# Patient Record
Sex: MALE | Race: BLACK OR AFRICAN AMERICAN | NOT HISPANIC OR LATINO | Employment: FULL TIME | URBAN - METROPOLITAN AREA
[De-identification: names, ages, dates, MRNs, and addresses within clinical notes are randomized per-mention and may not be internally consistent; named-entity substitution may affect disease eponyms.]

---

## 2023-08-04 LAB
CHLAMYDIA TRACH., AMPLIFIED: NEGATIVE
N. GONORRHEA, AMPLIFIED: NEGATIVE

## 2025-06-29 ENCOUNTER — HOSPITAL ENCOUNTER (EMERGENCY)
Facility: HOSPITAL | Age: 25
Discharge: HOME | End: 2025-06-29
Payer: COMMERCIAL

## 2025-06-29 ENCOUNTER — APPOINTMENT (OUTPATIENT)
Dept: RADIOLOGY | Facility: HOSPITAL | Age: 25
End: 2025-06-29
Payer: COMMERCIAL

## 2025-06-29 VITALS
HEART RATE: 67 BPM | OXYGEN SATURATION: 100 % | WEIGHT: 155 LBS | TEMPERATURE: 98.1 F | DIASTOLIC BLOOD PRESSURE: 70 MMHG | SYSTOLIC BLOOD PRESSURE: 150 MMHG | HEIGHT: 72 IN | RESPIRATION RATE: 16 BRPM | BODY MASS INDEX: 20.99 KG/M2

## 2025-06-29 DIAGNOSIS — S62.326A DISPLACED FRACTURE OF SHAFT OF FIFTH METACARPAL BONE, RIGHT HAND, INITIAL ENCOUNTER FOR CLOSED FRACTURE: Primary | ICD-10-CM

## 2025-06-29 PROCEDURE — 73130 X-RAY EXAM OF HAND: CPT | Mod: RIGHT SIDE | Performed by: RADIOLOGY

## 2025-06-29 PROCEDURE — 2500000001 HC RX 250 WO HCPCS SELF ADMINISTERED DRUGS (ALT 637 FOR MEDICARE OP)

## 2025-06-29 PROCEDURE — 99283 EMERGENCY DEPT VISIT LOW MDM: CPT

## 2025-06-29 PROCEDURE — 73130 X-RAY EXAM OF HAND: CPT | Mod: RT

## 2025-06-29 PROCEDURE — 29125 APPL SHORT ARM SPLINT STATIC: CPT

## 2025-06-29 RX ORDER — ACETAMINOPHEN 325 MG/1
650 TABLET ORAL EVERY 6 HOURS PRN
Qty: 30 TABLET | Refills: 0 | Status: SHIPPED | OUTPATIENT
Start: 2025-06-29

## 2025-06-29 RX ORDER — IBUPROFEN 800 MG/1
800 TABLET, FILM COATED ORAL 3 TIMES DAILY
Qty: 21 TABLET | Refills: 0 | Status: SHIPPED | OUTPATIENT
Start: 2025-06-29 | End: 2025-07-06

## 2025-06-29 RX ORDER — ACETAMINOPHEN 325 MG/1
975 TABLET ORAL ONCE
Status: COMPLETED | OUTPATIENT
Start: 2025-06-29 | End: 2025-06-29

## 2025-06-29 RX ORDER — IBUPROFEN 400 MG/1
400 TABLET, FILM COATED ORAL ONCE
Status: COMPLETED | OUTPATIENT
Start: 2025-06-29 | End: 2025-06-29

## 2025-06-29 RX ADMIN — ACETAMINOPHEN 975 MG: 325 TABLET ORAL at 17:17

## 2025-06-29 RX ADMIN — IBUPROFEN 400 MG: 400 TABLET ORAL at 17:17

## 2025-06-29 ASSESSMENT — PAIN SCALES - GENERAL: PAINLEVEL_OUTOF10: 6

## 2025-06-29 ASSESSMENT — PAIN - FUNCTIONAL ASSESSMENT: PAIN_FUNCTIONAL_ASSESSMENT: 0-10

## 2025-06-29 NOTE — ED PROVIDER NOTES
HPI   Chief Complaint   Patient presents with    Hand Pain       Patient is a 25-year-old male presenting to the emergency department for evaluation of pain in the right hand after punching a basketball pole.  Patient states it happened approximately an hour prior to arrival.  He is not complaining of pain in the right pinky and ring finger specifically with movement.  He has noticed increased swelling in it as well.  States he has not take any medications for the pain.  Denies any numbness or tingling in the arm or hand.              Patient History   Medical History[1]  Surgical History[2]  Family History[3]  Social History[4]    Physical Exam   ED Triage Vitals [06/29/25 1658]   Temperature Heart Rate Respirations BP   36.7 °C (98.1 °F) 67 16 150/70      Pulse Ox Temp Source Heart Rate Source Patient Position   100 % Temporal -- --      BP Location FiO2 (%)     -- --       Physical Exam  Vitals and nursing note reviewed.     GENERAL APPEARANCE: This patient is in no acute respiratory distress. Awake and alert. Talking appropriately. Answering questions appropriately. No evidence of pressured speech.    VITAL SIGNS: As per the nurses' triage record.    HEENT: Normocephalic, atraumatic.    NECK:  full gross ROM during exam    MUSCULOSKELETAL: Limited range of motion of the right pinky and ring finger with overlying swelling and ecchymosis along the dorsal aspect of the knuckles.    NEUROLOGICAL: Awake, alert and oriented x 3.  Normal sensation along the right radial, ulnar, and median nerve distribution of the right hand.    IMMUNOLOGICAL: No palpable lymphadenopathy or lymphatic streaking noted on visible skin.    DERM: Swelling and ecchymosis noted to the dorsal aspect of the right hand    PSYCH: mood and affect appear normal.        ED Course & MDM   Diagnoses as of 06/29/25 1816   Displaced fracture of shaft of fifth metacarpal bone, right hand, initial encounter for closed fracture                 No data  recorded     [place] Coma Scale Score: 15 (06/29/25 1658 : Chandrika Champagne RN)                           Medical Decision Making  **Disclaimer parts of this chart have been completed using voice recognition software. Please excuse any errors of transcription.     Evaluated this patient independently and my supervising physician was available for consultation.    HPI: Detailed above.    Exam: A medically appropriate exam performed, outlined above, given the known history and presentation.    History obtained from: Patient    Labs/Diagnostics:  XR hand right 3+ views   Final Result   Acute mildly displaced fracture of the 5th metacarpal mid shaft with   associated soft tissue swelling.             MACRO:   None        Signed by: Dalton Welch 6/29/2025 6:04 PM   Dictation workstation:   [id number]        EMERGENCY DEPARTMENT COURSE and DIFFERENTIAL DIAGNOSIS/MDM:  Patient is a 25-year-old male presenting to the emergency department for evaluation of right hand injury.  On physical exam vital signs remarkable for systolic hypertension but otherwise stable patient is no acute distress.  Patient has been swelling and ecchymosis over the dorsal aspect of the right hand specifically over the pinky and ring finger knuckles.  Limited range of motion of the right pinky and ring finger due to pain.  Normal sensation and pulses in the right hand.  X-ray of the hand ordered as well as p.o. Tylenol ibuprofen for pain.  X-ray of the right hand showed an acute mildly displaced fracture of the fifth metacarpal midshaft with associated soft tissue swelling.  Patient placed in an ulnar gutter splint and discharged in stable condition.  He was given a prescription for Tylenol and ibuprofen.  He was advised to follow-up with orthopedics outpatient for further management of fracture.  He will return to the emergency department with any new or worsening symptoms.    The patient presented with a chief complaint of right hand injury. The  differential diagnosis associated with this patient's presentation includes fracture, dislocation, contusion.     Vitals:    Vitals:    06/29/25 1658   BP: 150/70   Pulse: 67   Resp: 16   Temp: 36.7 °C (98.1 °F)   TempSrc: Temporal   SpO2: 100%   Weight: 70.3 kg (155 lb)   Height: 1.829 m (6')     History Limited by:    None    Independent history obtained from:    None    External records reviewed:    None    Diagnostics interpreted by me:    Xrays - see my independent interpretation in [facility]    Discussions with other clinicians:    None    Chronic conditions impacting care:    None    Social determinants of health affecting care:    None    Diagnostic tests considered but not performed: None    ED Medications managed:    Medications   acetaminophen (Tylenol) tablet 975 mg (975 mg oral Given 6/29/25 1717)   ibuprofen tablet 400 mg (400 mg oral Given 6/29/25 1717)       Prescription drugs considered:    Pain Medications Tylenol and ibuprofen    Screenings:              Procedure  Splint Application    Performed by: Ronit Perea PA-C  Authorized by: Ronit Perea PA-C    Consent:     Consent obtained:  Verbal    Consent given by:  Patient    Risks, benefits, and alternatives were discussed: yes      Risks discussed:  Discoloration, numbness, swelling and pain    Alternatives discussed:  No treatment and delayed treatment  Universal protocol:     Procedure explained and questions answered to patient or proxy's satisfaction: yes      Imaging studies available: yes      Site/side marked: yes      Immediately prior to procedure a time out was called: yes      Patient identity confirmed:  Verbally with patient  Pre-procedure details:     Distal neurologic exam:  Normal    Distal perfusion: distal pulses strong and brisk capillary refill    Procedure details:     Location:  Hand    Hand location:  R hand    Splint type:  Ulnar gutter    Supplies:  Fiberglass, elastic bandage and cotton padding    Supervision: ALEXIS  personally supervised and inspected the splint/strap which was applied. The extremity is appropriately immobilized. Patient neurovascularly intact before and after the splint application.    Post-procedure details:     Distal neurologic exam:  Unchanged    Distal perfusion: unchanged      Procedure completion:  Tolerated well, no immediate complications    Post-procedure imaging: not applicable             [1] No past medical history on file.  [2] No past surgical history on file.  [3] No family history on file.  [4]   Social History  Tobacco Use    Smoking status: Not on file    Smokeless tobacco: Not on file   Substance Use Topics    Alcohol use: Not on file    Drug use: Not on file        Ronit Perea PA-C  06/29/25 [id number]

## 2025-06-29 NOTE — DISCHARGE INSTRUCTIONS
You have been splinted here in the Emergency Department. You should NOT take this splint off until seen by a specialist for further evaluation as discussed. Allow for the specialist to remove the splint. Keep splint clean and dry. If you wish to shower, use a garbage bag or other such method to ensure the splint stays dry. - Be sure to return to the ER without delay if you feel like your splint is too tight, your fingers/toes are turning colors or you begin to loose sensation or motor function of the affected area.    If you have been provided with a sling (based on the specific injury) wear the sling as often as possible and ensure prompt follow as directed. *(if you have not been provided with a sling please disregard).

## 2025-06-29 NOTE — Clinical Note
Dieter Forbes was seen and treated in our emergency department on 6/29/2025.  He may return to work on 07/02/2025.       If you have any questions or concerns, please don't hesitate to call.      Ronit Perea PA-C